# Patient Record
Sex: FEMALE | Race: WHITE | NOT HISPANIC OR LATINO | Employment: FULL TIME | URBAN - METROPOLITAN AREA
[De-identification: names, ages, dates, MRNs, and addresses within clinical notes are randomized per-mention and may not be internally consistent; named-entity substitution may affect disease eponyms.]

---

## 2021-03-22 ENCOUNTER — BULK ORDERING (OUTPATIENT)
Dept: CASE MANAGEMENT | Facility: OTHER | Age: 66
End: 2021-03-22

## 2021-03-22 DIAGNOSIS — Z23 IMMUNIZATION DUE: ICD-10-CM

## 2023-09-15 ENCOUNTER — APPOINTMENT (OUTPATIENT)
Dept: CT IMAGING | Facility: HOSPITAL | Age: 68
End: 2023-09-15
Payer: MEDICARE

## 2023-09-15 ENCOUNTER — HOSPITAL ENCOUNTER (EMERGENCY)
Facility: HOSPITAL | Age: 68
Discharge: HOME | End: 2023-09-15
Attending: STUDENT IN AN ORGANIZED HEALTH CARE EDUCATION/TRAINING PROGRAM
Payer: MEDICARE

## 2023-09-15 ENCOUNTER — APPOINTMENT (OUTPATIENT)
Dept: GENERAL RADIOLOGY | Facility: HOSPITAL | Age: 68
End: 2023-09-15
Payer: MEDICARE

## 2023-09-15 VITALS
DIASTOLIC BLOOD PRESSURE: 62 MMHG | SYSTOLIC BLOOD PRESSURE: 127 MMHG | BODY MASS INDEX: 20.4 KG/M2 | OXYGEN SATURATION: 91 % | HEART RATE: 70 BPM | HEIGHT: 67 IN | TEMPERATURE: 97.3 F | WEIGHT: 130 LBS | RESPIRATION RATE: 18 BRPM

## 2023-09-15 DIAGNOSIS — S09.90XA INJURY OF HEAD, INITIAL ENCOUNTER: ICD-10-CM

## 2023-09-15 DIAGNOSIS — M54.50 ACUTE MIDLINE LOW BACK PAIN WITHOUT SCIATICA: ICD-10-CM

## 2023-09-15 DIAGNOSIS — M25.551 PAIN OF RIGHT HIP: ICD-10-CM
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
## 2023-09-15 PROCEDURE — 73502 X-RAY EXAM HIP UNI 2-3 VIEWS: CPT

## 2023-09-15 PROCEDURE — 73070 X-RAY EXAM OF ELBOW: CPT

## 2023-09-15 PROCEDURE — 99284 EMERGENCY DEPT VISIT MOD MDM: CPT

## 2023-09-15 PROCEDURE — 70450 CT HEAD/BRAIN W/O DYE: CPT

## 2023-09-15 PROCEDURE — 72125 CT NECK SPINE W/O DYE: CPT

## 2023-09-15 PROCEDURE — 72131 CT LUMBAR SPINE W/O DYE: CPT

## 2023-09-15 PROCEDURE — 72128 CT CHEST SPINE W/O DYE: CPT

## 2023-09-15 PROCEDURE — 73552 X-RAY EXAM OF FEMUR 2/>: CPT

## 2023-09-15 NOTE — ED PROVIDER NOTES
EMERGENCY DEPARTMENT ENCOUNTER    Room Number:  35/35  PCP: System, Provider Not In  Historian: Patient, family member      HPI:  Chief Complaint: Fall, head injury, elbow and hip pain    Context: Sherrie Gaston is a 67 y.o. female who presents to the ED c/o fall.  Patient states she was trying to get into a chair and she had a house slippers on.  The slippers slipped and patient fell.  Patient states she struck the back of her head on a door frame.  Patient states she also hit her left elbow and right hip.  Patient is having difficulty lying on her right side due to the severity of the pain in her hip and femur.  Patient states she did not lose consciousness but is on blood thinners.  Patient is uncertain why she is on blood thinners but states she has had strokes in the past.            PAST MEDICAL HISTORY  Active Ambulatory Problems     Diagnosis Date Noted    No Active Ambulatory Problems     Resolved Ambulatory Problems     Diagnosis Date Noted    No Resolved Ambulatory Problems     Past Medical History:   Diagnosis Date    Asthma     Heart attack     Stroke          PAST SURGICAL HISTORY  Past Surgical History:   Procedure Laterality Date    HYSTERECTOMY           FAMILY HISTORY  History reviewed. No pertinent family history.      SOCIAL HISTORY  Social History     Socioeconomic History    Marital status: Single   Tobacco Use    Smoking status: Former   Vaping Use    Vaping Use: Never used   Substance and Sexual Activity    Alcohol use: Not Currently    Drug use: Defer    Sexual activity: Defer         ALLERGIES  Patient has no known allergies.        REVIEW OF SYSTEMS  Review of Systems   Musculoskeletal:  Positive for neck pain.   Neurological:  Positive for headaches.   All other systems reviewed and are negative.         PHYSICAL EXAM  ED Triage Vitals   Temp Heart Rate Resp BP SpO2   09/15/23 1723 09/15/23 1723 09/15/23 1801 09/15/23 1757 09/15/23 1723   97.3 °F (36.3 °C) 88 18 147/77 96 %      Temp src  Heart Rate Source Patient Position BP Location FiO2 (%)   09/15/23 1723 09/15/23 1723 -- -- --   Tympanic Monitor          Physical Exam      GENERAL: no acute distress  HENT: nares patent  EYES: no scleral icterus  CV: regular rhythm, normal rate  RESPIRATORY: normal effort  ABDOMEN: soft  MUSCULOSKELETAL: Tenderness to palpation in the left elbow and right hip/mid femur.  Patient additionally noted to have tenderness to palpation throughout the C-spine and upper lumbar/lower thoracic spines.    NEURO: alert, moves all extremities, follows commands  PSYCH:  calm, cooperative  SKIN: warm, dry    Vital signs and nursing notes reviewed.          LAB RESULTS  No results found for this or any previous visit (from the past 24 hour(s)).    Ordered the above labs and reviewed the results.        RADIOLOGY  XR ELBOW 2 VIEW LEFT, XR Femur 2 View Right    Result Date: 9/15/2023  Clinical: Fell with elbow pain, femur pain  Left elbow findings: No joint effusion, fracture nor dislocation. Bone hypertrophy is demonstrated along the medial humeral epicondyle, consistent with epicondylitis. Soft tissues have a satisfactory appearance.  CONCLUSION: No acute osseous nor articular abnormality.  Right femur findings: There is knee joint narrowing. Hip joint preserved. No acute osseous abnormality of the right femur. No bone lesion or soft tissue abnormality seen.  CONCLUSION: No acute osseous abnormality.  This report was finalized on 9/15/2023 6:43 PM by Dr. Michael Arzate M.D.      CT Head Without Contrast, CT Cervical Spine Without Contrast, CT Thoracic Spine Without Contrast, CT Lumbar Spine Without Contrast    Result Date: 9/15/2023  EMERGENCY CT SCAN OF THE HEAD AND CT SCAN OF THE CERVICAL, THORACIC AND LUMBAR SPINE WITHOUT CONTRAST ON 09/15/2023  CLINICAL HISTORY: Head injury. Patient felt off chair onto back, has neck and back pain  HEAD CT TECHNIQUE: Spiral CT images were obtained from the base of the skull to the vertex  without intravenous contrast. The images were reformatted and are submitted in 3 mm thick axial, sagittal and coronal CT sections with brain algorithm and 2 mm thick axial CT sections with high resolution bone algorithm.  COMPARISON: There are no prior head CTs from [facility] for comparison.  FINDINGS: The brain parenchyma is normal in attenuation. The ventricles are normal in size. I see no focal mass effect. There is no midline shift. No extra axial fluid collections are identified. There is no evidence of acute intracranial hemorrhage. No acute skull fracture is identified. There is a small lucent lesion in the anterior midline of the frontal bone measuring 9 x 10 mm in size probably a benign calvarial hemangioma. Otherwise the calvarium and skull base are normal in appearance. Paranasal sinuses and mastoid air cells and middle ear cavities are clear.      1. No acute intracranial abnormality is identified.  2. There is a 10 x 9 mm lucent lesion in the anterior midline of the frontal bone probably a benign calvarial hemangioma. The remainder of the head CT is within normal limits with no acute skull fracture or intracranial hemorrhage identified.  CERVICAL SPINE CT TECHNIQUE: Spiral CT images were obtained from the skull base down to the T3-4 thoracic level. The images were reformatted and submitted in 2 mm thick axial and sagittal CT sections with soft tissue algorithm and 1 mm thick axial, sagittal and coronal CT sections with high-resolution bone algorithm.  FINDINGS: The atlantooccipital articulation is within normal limits.  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  At C2-3 posterior disc margin is normal. There is mild right facet overgrowth. The uncovertebral joints are normal and there is no foraminal narrowing.  At C3-4 there is moderate left facet overgrowth. There is left posterolateral endplate  spurring mildly narrowing the left-sided canal. Left-sided uncovertebral joint hypertrophy in combination with facet spurs moderate to severely narrow the left neural foramen. There is no right foraminal narrowing.  At C4-5 there is moderate left facet overgrowth. There is some left posterolateral endplate spurring, mildly narrows left side of the canal, left uncovertebral joint spurs in combination with left facet overgrowth moderately narrows the left neural foramen. There is no narrowing of the right side of the canal or right neural foramen.  At C5-6 there is moderate left facet overgrowth.  There is minimal posterior spurring. There is mild canal narrowing.  There is mild left and no right foraminal narrowing.  At C6-7 there is disc space narrowing.  There are degenerative endplate changes, mild posterior spurring, mild canal narrowing.  Facets are normal. There is some mild uncovertebral joint hypertrophy.  There is mild bilateral bony foraminal narrowing.  At C7-T1 posterior disc margin and facets are normal with no canal or foraminal narrowing.  No acute fracture seen in the cervical spine.  IMPRESSION: 1. No acute fracture is seen in the cervical spine.  There is cervical spondylosis as described.  Thoracic spine CT technique: Spiral CT images were obtained from the C5-6 cervical level down through the thoracic spine from the superior body of the L2 lumbar level and the images were reformatted and submitted in 3 mm thick axial CT sections with soft tissue algorithm and 1 mm thick axial CT sections with high-resolution bone algorithm and 2 mm thick sagittal and coronal reconstructions were performed and submitted in both bone and soft tissue algorithm.  FINDINGS: There is a large 8 cm hiatal hernia posterior to the heart. No thoracic disc herniation, no significant thoracic canal narrowing is identified. The thoracic vertebral body heights are well-maintained. No acute fracture is seen in thoracic spine.  There is anterior marginal endplate spurring from T3 down to T12.  IMPRESSION: 1. No acute fracture is seen in he thoracic spine. There is mild thoracic spondylosis with anterior marginal endplate spurring from T3-T12 but see no canal or foraminal narrowing in the thoracic spine.  Lumbar spine CT technique: Spiral CT images were obtained from the T10 thoracic level down to the S3 sacral level and the images were reformatted and submitted in 3 mm thick axial CT sections with soft tissue algorithm and 1 mm thick axial CT sections with high-resolution bone algorithm  and 2 mm thick sagittal and coronal reconstructions were performed and submitted in bone and soft tissue algorithm  FINDINGS: At T10-11, T11-12, T12-L1, the disc space and facets are normal in appearance with no canal or foraminal narrowing  At L1-2, there is diffuse posterior disc bulge facets are normal there is minimal canal narrowing there is mild bilateral foraminal narrowing  At L2-3 posterior disc margins normal.  There is mild bilateral facet overgrowth. There is essentially no canal or foraminal narrowing.  At L3-4 there is mild to moderate bilateral facet overgrowth.  There is air along the medial margin of the right facets, may be air containing in a small 3 mm synovial cyst that indents the right posterolateral aspect of the thecal sac and there is diffuse posterior disc bulge. There is moderate narrowing of the thecal sac. There is mild bilateral foraminal narrowing.  At L4-5 there is moderate bilateral facet overgrowth. There is mild posterior spurring. There is motion artifact that blurs the images through the disc level. There is what appears to be moderate narrowing of the thecal sac, slight narrowing of the right lateral recess and there is mild bilateral foraminal narrowing.  At L5-S1 there is moderate right facet overgrowth, anterolateral right facet spur slightly narrow the right lateral recess and abut the posterolateral margin of the  traversing right S1 nerve root. There is 3 mm retrolisthesis of L5 on S1, mild posterior spurring. There is no canal or left lateral recess narrowing. There is mild right and no left foraminal narrowing.  No acute fracture is seen in the lumbar spine.  IMPRESSION: 1. No acute fracture is seen in the lumbar spine, there is lumbar spondylosis as described.  Radiation dose reduction techniques were utilized, including automated exposure control and exposure modulation based on body size.       XR Hip With or Without Pelvis 2 - 3 View Right    Result Date: 9/15/2023  Clinical: Fell with right hip pain  FINDINGS: The right hemipelvis is satisfactory in appearance. The hip joints are preserved and normal. No avascular necrosis, bone lesion or fracture on the right is demonstrated. Surrounding soft tissues have a satisfactory appearance.  CONCLUSION: No acute osseous nor articular abnormality.  This report was finalized on 9/15/2023 6:45 PM by Dr. Michael Arzate M.D.       Ordered the above noted radiological studies. Reviewed by me in PACS.            MEDICATIONS GIVEN IN ER  Medications - No data to display                MEDICAL DECISION MAKING, PROGRESS, and CONSULTS    67-year-old female presenting for evaluation after a fall.  Patient had mechanical fall where her feet slipped.  Patient did strike her head but had no loss of consciousness.  Patient is on blood thinners.  Patient has pain in her cervical/thoracic/lumbar spines, left elbow and right hip/thigh.  Radiological evaluation demonstrates no acute fractures or malalignment in the head/cervical spine/thoracic spine/lumbar spine/right hip/right femur/left elbow.  Patient is able to ambulate without difficulty.  Patient educated on supportive care measures and pain control at home.  Patient counseled to follow-up with primary care physician and discharged in stable condition.      All labs have been independently reviewed by me.  All radiology studies have been  reviewed by me and I have also reviewed the radiology report.   EKG's independently viewed and interpreted by me.  Discussion below represents my analysis of pertinent findings related to patient's condition, differential diagnosis, treatment plan and final disposition.      Additional sources:  - Discussed/ obtained information from independent historians: Family member present at bedside    - External (non-ED) record review: Office visit with internal medicine from 6/28/2023 reviewed and notable for management of chronic conditions including asthma, hypertension, hyperlipidemia.  Plan at that visit was to continue current medications and obtain basic laboratory evaluation including lipid panel.    - Chronic or social conditions impacting care: History of CVAs    - Shared decision making: Utilizing shared decision-making techniques we discussed staying in the hospital for further evaluation versus symptomatic management at home.  At this time the patient wants to be at home.      Orders placed during this visit:  Orders Placed This Encounter   Procedures    CT Head Without Contrast    CT Cervical Spine Without Contrast    CT Thoracic Spine Without Contrast    CT Lumbar Spine Without Contrast    XR ELBOW 2 VIEW LEFT    XR Hip With or Without Pelvis 2 - 3 View Right    XR Femur 2 View Right           Differential diagnosis includes but is not limited to:    Hip fracture, intracranial hemorrhage, concussion, cervical spine strain      Independent interpretation of labs, radiology studies, and discussions with consultants:  ED Course as of 09/15/23 2036   Fri Sep 15, 2023   1923 X-ray of hip interpreted by me and demonstrates no evidence of fracture or malalignment [MW]   1923 CT head interpreted by me and demonstrates no evidence of intracranial hemorrhage [MW]      ED Course User Index  [MW] Vadim Lee MD             DIAGNOSIS  Final diagnoses:   Fall, initial encounter   Injury of head, initial encounter    Pain of right hip   Acute midline low back pain without sciatica         DISPOSITION  DISCHARGE    Patient discharged in stable condition.    Reviewed implications of results, diagnosis, meds, responsibility to follow up, warning signs and symptoms of possible worsening, potential complications and reasons to return to ER, including uncontrolled pain, changes in mental status, difficulty with ambulation.    Patient/Family voiced understanding of above instructions.    Discussed plan for discharge, as there is no emergent indication for admission. Patient referred to primary care provider for BP management due to today's BP. Pt/family is agreeable and understands need for follow up and repeat testing.  Pt is aware that discharge does not mean that nothing is wrong but it indicates no emergency is present that requires admission and they must continue care with follow-up as given below or physician of their choice.     FOLLOW-UP  System, Provider Not In  [place] [id number]               Medication List      No changes were made to your prescriptions during this visit.                   Latest Documented Vital Signs:  As of 20:36 EDT  BP- 127/62 HR- 70 Temp- 97.3 °F (36.3 °C) (Tympanic) O2 sat- 91%              --    Please note that portions of this were completed with a voice recognition program.       Note Disclaimer: At [facility], we believe that sharing information builds trust and better relationships. You are receiving this note because you are receiving care at [facility] or recently visited. It is possible you will see health information before a provider has talked with you about it. This kind of information can be easy to misunderstand. To help you fully understand what it means for your health, we urge you to discuss this note with your provider.             Vadim Lee MD  09/15/23 2037

## 2023-09-15 NOTE — ED TRIAGE NOTES
Pt reports to ER due to fall. Pt was changing smoke alarm, slipped due to no slip socks and was unable to catch herself. Pt hit head, on blood thinners. PERRLA. No LOC. Pt denies blurry vision and denies numbness or tingling to hands and feet, and is alert and oriented x 4. Reports back, neck, elbow pain. C collar placed.

## 2023-09-16 NOTE — DISCHARGE INSTRUCTIONS
Please follow-up with your primary care physician within 1 week for repeat evaluation and blood pressure check    Please return to the emergency department with new or worsening symptoms including but not limited to changes in mental status, uncontrolled pain, difficulty with ambulation    You may treat your pain over the next 2 to 3 days with ibuprofen and Tylenol at home

## 2025-01-23 ENCOUNTER — HOSPITAL ENCOUNTER (EMERGENCY)
Facility: HOSPITAL | Age: 70
Discharge: HOME | End: 2025-01-23
Attending: EMERGENCY MEDICINE
Payer: MEDICARE

## 2025-01-23 ENCOUNTER — APPOINTMENT (OUTPATIENT)
Dept: GENERAL RADIOLOGY | Facility: HOSPITAL | Age: 70
End: 2025-01-23
Payer: MEDICARE

## 2025-01-23 VITALS
DIASTOLIC BLOOD PRESSURE: 76 MMHG | SYSTOLIC BLOOD PRESSURE: 146 MMHG | TEMPERATURE: 98.6 F | OXYGEN SATURATION: 99 % | HEART RATE: 81 BPM | RESPIRATION RATE: 16 BRPM

## 2025-01-23 DIAGNOSIS — S40.011A CONTUSION OF RIGHT SHOULDER, INITIAL ENCOUNTER: Primary | ICD-10-CM

## 2025-01-23 DIAGNOSIS — S80.01XA CONTUSION OF RIGHT KNEE, INITIAL ENCOUNTER: ICD-10-CM

## 2025-01-23 PROCEDURE — 73030 X-RAY EXAM OF SHOULDER: CPT

## 2025-01-23 PROCEDURE — 73562 X-RAY EXAM OF KNEE 3: CPT

## 2025-01-23 PROCEDURE — 99283 EMERGENCY DEPT VISIT LOW MDM: CPT

## 2025-01-23 NOTE — Clinical Note
[facility] EMERGENCY DEPARTMENT  [address] [facility] [address]  Phone: [phone]    Sherrie Gaston was seen and treated in our emergency department on 1/23/2025.  She may return to work on 01/25/2025.         Thank you for choosing [facility].    Mirna Bernal RN

## 2025-01-24 NOTE — DISCHARGE INSTRUCTIONS
Please follow-up with your PCP.    Wear sling as needed for comfort but you should remove the sling at least a few times a day and progress range of motion as tolerated.  Use ice, ibuprofen, and Tylenol as needed for pain.    Although you are being discharged in the ED today, I encouraged her to return for worsening symptoms.  Things can, and do, change such a treatment at home with medication may not be adequate.  Specifically I recommend returning for chest pain or discomfort, difficulty breathing, persistent vomiting or difficulty holding down liquids or medications, fever > 102.0 F,  or any other worsening or alarming symptoms.     You have been evaluated in the emergency department for your presenting symptoms and deemed appropriate for discharge home.  Understand that your health care does not end here today.  It is important that your primary care physician be made aware of your visit.  I recommend calling your primary care provider in the next 48 hours to arrange follow-up care.  Your primary care provider needs to review your treatment and recovery to ensure that no further treatment is necessary.  Additional testing or procedures may be necessary as an outpatient at the discretion of your primary care provider.    I also recommend following up with your PCP for recheck of your blood pressure and treatment as needed.

## 2025-01-24 NOTE — ED PROVIDER NOTES
EMERGENCY DEPARTMENT ENCOUNTER  Room Number:  S01/01  PCP: System, Provider Not In  Independent Historians: Patient      HPI:  Chief Complaint: had concerns including Fall.     A complete HPI/ROS/PMH/PSH/SH/FH are unobtainable due to: None    Chronic or social conditions impacting patient care (Social Determinants of Health): None      Context: Sherrie Gaston is a 69 y.o. female with a medical history of CAD, stroke, and asthma presents emergency department today after mechanical fall at home.  She reports injuries to her right shoulder and right knee.  She denies hitting her head or LOC.  She says she has pain with movement of the shoulder and the knee.  She denies neck or back pain.  She denies prior surgeries or injuries to these joints.  She has been ambulatory since the fall.  She took an ibuprofen prehospital and says it did not help the pain.      Review of prior external notes (non-ED) -and- Review of prior external test results outside of this encounter:   Labs from 6/24/2024, creatinine 0.53, lipid panel normal      PAST MEDICAL HISTORY  Active Ambulatory Problems     Diagnosis Date Noted    No Active Ambulatory Problems     Resolved Ambulatory Problems     Diagnosis Date Noted    No Resolved Ambulatory Problems     Past Medical History:   Diagnosis Date    Asthma     Heart attack     Stroke          PAST SURGICAL HISTORY  Past Surgical History:   Procedure Laterality Date    HYSTERECTOMY           FAMILY HISTORY  No family history on file.      SOCIAL HISTORY  Social History     Socioeconomic History    Marital status: Single   Tobacco Use    Smoking status: Former   Vaping Use    Vaping status: Never Used   Substance and Sexual Activity    Alcohol use: Not Currently    Drug use: Defer    Sexual activity: Defer         ALLERGIES  Patient has no known allergies.      REVIEW OF SYSTEMS  Included in HPI  All systems reviewed and negative except for those discussed in HPI.      PHYSICAL EXAM    I have reviewed  the triage vital signs and nursing notes.    ED Triage Vitals   Temp Heart Rate Resp BP SpO2   01/23/25 1909 01/23/25 1909 01/23/25 1910 01/23/25 1909 01/23/25 1909   98.6 °F (37 °C) 81 16 146/76 99 %      Temp src Heart Rate Source Patient Position BP Location FiO2 (%)   01/23/25 1909 01/23/25 1909 -- 01/23/25 1909 --   Tympanic Monitor  Right arm        Physical Exam  Constitutional:       General: She is not in acute distress.     Appearance: Normal appearance.   HENT:      Head: Normocephalic and atraumatic.      Nose: Nose normal.      Mouth/Throat:      Mouth: Mucous membranes are moist.   Eyes:      Extraocular Movements: Extraocular movements intact.      Pupils: Pupils are equal, round, and reactive to light.   Cardiovascular:      Rate and Rhythm: Normal rate and regular rhythm.      Pulses: Normal pulses.      Heart sounds: Normal heart sounds.   Pulmonary:      Effort: Pulmonary effort is normal. No respiratory distress.      Breath sounds: Normal breath sounds.   Abdominal:      General: Abdomen is flat. There is no distension.      Palpations: Abdomen is soft.      Tenderness: There is no abdominal tenderness.   Musculoskeletal:         General: Normal range of motion.      Cervical back: Normal range of motion and neck supple.      Comments: No obvious swelling or deformity to the right shoulder but she is tender to palpation of the anterior and lateral aspect of the shoulder.  Normal range of motion but does elicit pain.  Normal range of motion in the elbow and wrist without tenderness to palpation. Positive thumbs up/okay sign/fingers abduct/fingers abduct, sensation intact light touch radial/medial/ulnar nerve distribution, 2+ radial and ulnar pulses, brisk cap refill all digits.    No obvious swelling or deformity to the right knee, mildly tender to palpation around the patella.  Range of motion is normal.  Sensation is intact to light touch throughout the bilateral lower extremities. Muscle  "strength is 5/5 and symmetrical with plantarflexion and EHL. Patellar reflexes are 2+ and equal bilaterally. DP and PT pulses are 2+ bilaterally.     No C, T, or L-spine tenderness.     Skin:     General: Skin is warm and dry.      Capillary Refill: Capillary refill takes less than 2 seconds.   Neurological:      General: No focal deficit present.      Mental Status: She is alert and oriented to person, place, and time.   Psychiatric:         Mood and Affect: Mood normal.         Behavior: Behavior normal.         RADIOLOGY  XR Shoulder 2+ View Right, XR Knee 3 View Right    Result Date: 1/23/2025  RIGHT SHOULDER, 3 VIEWS  HISTORY: Fall right shoulder pain  COMPARISON: None available  FINDINGS: Study is limited by patient positioning. No convincing evidence of fracture or dislocation within the limitation of the study.      As above   XR RIGHT KNEE, 3 VIEWS  HISTORY: Fall, right knee pain  COMPARISON: None  FINDINGS: There is no evidence of fracture or dislocation. No evidence of joint effusion. No significant joint space narrowing  IMPRESSION: No acute findings    This report was finalized on 1/23/2025 7:42 PM by Dr. oZltan Orozco M.D on Workstation: [id number]           MEDICATIONS GIVEN IN ER  Medications - No data to display        OUTPATIENT MEDICATION MANAGEMENT:  No current Epic-ordered facility-administered medications on file.     Current Outpatient Medications Ordered in Epic   Medication Sig Dispense Refill    Albuterol Sulfate (VENTOLIN HFA IN) Inhale daily.      Loratadine (CLARITIN PO) Take  by mouth daily.      NON FORMULARY Pt states \"I take a little pink pill for my breathing, I am suppose to take it every night but I havent had if for three days.\"  \"      NON FORMULARY Pt states \"I take a red inhaler for my asthma once a day.\"      predniSONE (DELTASONE) 20 MG tablet Take 3 tablets by mouth daily. 15 tablet 0         PROGRESS, DATA ANALYSIS, CONSULTS, AND MEDICAL DECISION MAKING  ORDERS PLACED " DURING THIS VISIT:  Orders Placed This Encounter   Procedures    XR Shoulder 2+ View Right    XR Knee 3 View Right    Obtain & Apply The Following- Upper extremity; Sling       All labs have been independently interpreted by me.  All radiology studies have been reviewed by me. All EKG's have been independently viewed and interpreted by me.  Discussion below represents my analysis of pertinent findings related to patient's condition, differential diagnosis, treatment plan and final disposition.    Differential diagnosis includes but is not limited to:   My differential includes but is not limited to shoulder contusion, clavicle fracture, scapular fracture, humeral head, neck or shaft fracture, AC separation, shoulder dislocation, shoulder sprain, axillary nerve palsy and myocardial infarction    Fracture, sprain, strain, contusion to the knee    ED Course:  ED Course as of 01/23/25 2047   Thu Jan 23, 2025 2029 I discussed the case with Dr. Phillips and he agrees to evaluate the patient at the bedside.    [CC]   2030 XR Shoulder 2+ View Right  My independent interpretation of the imaging study is no dislocation [TR]   2046 Patient is a 69-year-old female presents emergency department today after mechanical fall at home.  She injured her right shoulder and right knee.  She reports no other injuries associated with the fall.  On arrival here in the emergency department vitals are reassuring, she is afebrile.  On my exam the patient is tender to palpation in the right shoulder right knee without obvious outward signs of trauma.  Patient was evaluated with x-rays which showed no acute fracture.  She was placed in a sling for comfort.  Educated on rest, ice, elevating, and anti-inflammatories as needed for pain.  She will need to follow-up with primary care.  Return precautions are given.  She is propria for discharge with outpatient follow-up. [CC]      ED Course User Index  [CC] Cadence Parker PA-C  [TR] Alan  Joseph PINEDO MD           AS OF 20:47 EST VITALS:    BP - 146/76  HR - 81  TEMP - 98.6 °F (37 °C) (Tympanic)  O2 SATS - 99%        DIAGNOSIS  Final diagnoses:   Contusion of right shoulder, initial encounter   Contusion of right knee, initial encounter         DISPOSITION  ED Disposition       ED Disposition   Discharge    Condition   Stable    Comment   --                      Please note that portions of this document were completed with a voice recognition program.    Note Disclaimer: At [facility], we believe that sharing information builds trust and better relationships. You are receiving this note because you recently visited [facility]. It is possible you will see health information before a provider has talked with you about it. This kind of information can be easy to misunderstand. To help you fully understand what it means for your health, we urge you to discuss this note with your provider.     Cadence Parker PAVeneciaC  01/23/25 2048

## 2025-01-24 NOTE — ED NOTES
Patient to ed via ambulance with complaint of trip and fall onto knees and right side. Complaint of right knee and right shoulder pain. -loc, -thinners patient is a&ox4 at triage

## 2025-01-24 NOTE — ED PROVIDER NOTES
EMERGENCY DEPARTMENT MD ATTESTATION NOTE    Room Number:  S01/01  PCP: System, Provider Not In  Independent Historians: Patient, Family, and EMS    HPI:  A complete HPI/ROS/PMH/PSH/SH/FH are unobtainable due to: None    Chronic or social conditions impacting patient care (Social Determinants of Health): None      Context: Sherrie Gaston is a 69 y.o. female with a medical history of MI who presents to the ED c/o acute fall.  The patient reports that she was walking in her house today and she tripped over a cord and fell.  She reports pain to her right knee and her right shoulder.  She denies hitting her head.  She denies neck or back pain.  She reports pain to her right shoulder primarily.        Review of prior external notes (non-ED) -and- Review of prior external test results outside of this encounter:  Laboratory evaluation 12/16/2022 shows normal CMP    Prescription drug monitoring program review:           PHYSICAL EXAM    I have reviewed the triage vital signs and nursing notes.    ED Triage Vitals   Temp Heart Rate Resp BP SpO2   01/23/25 1909 01/23/25 1909 01/23/25 1910 01/23/25 1909 01/23/25 1909   98.6 °F (37 °C) 81 16 146/76 99 %      Temp src Heart Rate Source Patient Position BP Location FiO2 (%)   01/23/25 1909 01/23/25 1909 -- 01/23/25 1909 --   Tympanic Monitor  Right arm        Physical Exam  GENERAL: Awake, alert, no acute distress  SKIN: Warm, dry  HENT: Normocephalic, atraumatic  EYES: no scleral icterus  CV: regular rhythm, regular rate  RESPIRATORY: normal effort, lungs clear  ABDOMEN: soft, nontender, nondistended  MUSCULOSKELETAL: no deformity.  Small abrasion to the right knee.  No deformities.  Tenderness to the right posterior shoulder.  No open wounds.  Distal pulses, motor, sensation intact.  NEURO: alert, moves all extremities, follows commands            MEDICATIONS GIVEN IN ER  Medications - No data to display      ORDERS PLACED DURING THIS VISIT:  Orders Placed This Encounter    Procedures    XR Shoulder 2+ View Right    XR Knee 3 View Right    Obtain & Apply The Following- Upper extremity; Sling         PROCEDURES  Procedures            PROGRESS, DATA ANALYSIS, CONSULTS, AND MEDICAL DECISION MAKING  All labs have been independently interpreted by me.  All radiology studies have been reviewed by me. All EKG's have been independently viewed and interpreted by me.  Discussion below represents my analysis of pertinent findings related to patient's condition, differential diagnosis, treatment plan and final disposition.    Differential diagnosis includes but is not limited to fracture, dislocation, sprain, strain, rotator cuff tear.    Clinical Scores:                                     ED Course as of 01/23/25 2053   Thu Jan 23, 2025 2029 I discussed the case with Dr. Phillips and he agrees to evaluate the patient at the bedside.    [CC]   2030 XR Shoulder 2+ View Right  My independent interpretation of the imaging study is no dislocation [TR]   2046 Patient is a 69-year-old female presents emergency department today after mechanical fall at home.  She injured her right shoulder and right knee.  She reports no other injuries associated with the fall.  On arrival here in the emergency department vitals are reassuring, she is afebrile.  On my exam the patient is tender to palpation in the right shoulder right knee without obvious outward signs of trauma.  Patient was evaluated with x-rays which showed no acute fracture.  She was placed in a sling for comfort.  Educated on rest, ice, elevating, and anti-inflammatories as needed for pain.  She will need to follow-up with primary care.  Return precautions are given.  She is propria for discharge with outpatient follow-up. [CC]      ED Course User Index  [CC] Cadence Parker PA-C  [TR] Joseph Phillips MD       MDM: Plan x-ray of the right shoulder as well as right knee.  Will evaluate for fracture or dislocation.  She has no evidence of other  injury.      COMPLEXITY OF CARE  Admission was considered but after careful review of the patient's presentation, physical examination, diagnostic results, and response to treatment the patient may be safely discharged with outpatient follow-up.    Please note that portions of this document were completed with a voice recognition program.    Note Disclaimer: At [facility], we believe that sharing information builds trust and better relationships. You are receiving this note because you recently visited [facility]. It is possible you will see health information before a provider has talked with you about it. This kind of information can be easy to misunderstand. To help you fully understand what it means for your health, we urge you to discuss this note with your provider.         Joseph Phillips MD  01/23/25 7929